# Patient Record
Sex: MALE | Race: WHITE | NOT HISPANIC OR LATINO | URBAN - METROPOLITAN AREA
[De-identification: names, ages, dates, MRNs, and addresses within clinical notes are randomized per-mention and may not be internally consistent; named-entity substitution may affect disease eponyms.]

---

## 2020-11-10 ENCOUNTER — OFFICE VISIT (OUTPATIENT)
Dept: FAMILY MEDICINE | Facility: CLINIC | Age: 75
End: 2020-11-10
Payer: MEDICARE

## 2020-11-10 VITALS
SYSTOLIC BLOOD PRESSURE: 136 MMHG | DIASTOLIC BLOOD PRESSURE: 84 MMHG | HEART RATE: 73 BPM | OXYGEN SATURATION: 96 % | WEIGHT: 172.81 LBS

## 2020-11-10 DIAGNOSIS — Z11.59 NEED FOR HEPATITIS C SCREENING TEST: ICD-10-CM

## 2020-11-10 DIAGNOSIS — I10 ESSENTIAL (PRIMARY) HYPERTENSION: ICD-10-CM

## 2020-11-10 DIAGNOSIS — I10 HYPERTENSION, UNSPECIFIED TYPE: Primary | ICD-10-CM

## 2020-11-10 PROCEDURE — 99204 OFFICE O/P NEW MOD 45 MIN: CPT | Mod: S$GLB,,, | Performed by: PHYSICIAN ASSISTANT

## 2020-11-10 PROCEDURE — 99204 PR OFFICE/OUTPT VISIT, NEW, LEVL IV, 45-59 MIN: ICD-10-PCS | Mod: S$GLB,,, | Performed by: PHYSICIAN ASSISTANT

## 2020-11-10 RX ORDER — NEOMYCIN SULFATE, POLYMYXIN B SULFATE AND DEXAMETHASONE 3.5; 10000; 1 MG/ML; [USP'U]/ML; MG/ML
SUSPENSION/ DROPS OPHTHALMIC
COMMUNITY

## 2020-11-10 RX ORDER — LOSARTAN POTASSIUM 50 MG/1
50 TABLET ORAL DAILY
COMMUNITY
End: 2020-11-22 | Stop reason: SDUPTHER

## 2020-11-10 RX ORDER — MULTIVITAMIN
1 TABLET ORAL DAILY
COMMUNITY

## 2020-11-10 NOTE — PROGRESS NOTES
Subjective:       Patient ID: Hudson Christie is a 75 y.o. male.    Chief Complaint: Establish Care (moved to area 3 weeks ago)    Hudson Christie is a 76 yo male coming to [facility]. He has HTN, but is stable.     Past Medical History:  No date: Cataract  No date: Hypertension    Past Surgical History:  No date: EYE SURGERY; Bilateral      Comment:  cataract  No date: HERNIA REPAIR  No date: JOINT REPLACEMENT; Right      Comment:  hip  No date: JOINT REPLACEMENT; Right      Comment:  knee  No date: VASECTOMY    History reviewed.  No pertinent family history.      Social History    Socioeconomic History      Marital status: Single      Spouse name: Not on file      Number of children: Not on file      Years of education: Not on file      Highest education level: Not on file    Occupational History      Not on file    Social Needs      Financial resource strain: Not on file      Food insecurity        Worry: Not on file        Inability: Not on file      Transportation needs        Medical: Not on file        Non-medical: Not on file    Tobacco Use      Smoking status: Never Smoker    Substance and Sexual Activity      Alcohol use: Never        Frequency: Never      Drug use: Never      Sexual activity: Yes    Lifestyle      Physical activity        Days per week: Not on file        Minutes per session: Not on file      Stress: Not on file    Relationships      Social connections        Talks on phone: Not on file        Gets together: Not on file        Attends Synagogue service: Not on file        Active member of club or organization: Not on file        Attends meetings of clubs or organizations: Not on file        Relationship status: Not on file    Other Topics      Concerns:        Not on file    Social History Narrative      Not on file      Review of patient's allergies indicates:  No Known Allergies    Current Outpatient Medications:   losartan (COZAAR) 50 MG tablet, Take 50 mg by mouth once daily., Disp: , Rfl:     multivitamin (THERAGRAN) per tablet, Take 1 tablet by mouth once daily., Disp: , Rfl:   neomycin-polymyxin-dexamethasone (MAXITROL) 3.5mg/mL-10,000 unit/mL-0.1 % DrpS, neomycin-polymyxin-dexameth 3.5 mg/mL-10,000 unit/mL-0.1% eye drops, Disp: , Rfl:     /84   Pulse 73   Wt 78.4 kg (172 lb 13.5 oz)   SpO2 96%     Hypertension  This is a chronic problem. The current episode started more than 1 year ago. The problem is unchanged. The problem is controlled. Pertinent negatives include no anxiety, blurred vision, chest pain, headaches, malaise/fatigue, neck pain, orthopnea, palpitations, peripheral edema, PND, shortness of breath or sweats. There are no associated agents to hypertension. Past treatments include ACE inhibitors and diuretics. The current treatment provides moderate improvement. There are no compliance problems.      Review of Systems   Constitutional: Negative for activity change, appetite change and malaise/fatigue.   Eyes: Negative for blurred vision.   Respiratory: Negative for chest tightness and shortness of breath.    Cardiovascular: Negative for chest pain, palpitations, orthopnea and PND.   Gastrointestinal: Negative for abdominal pain and constipation.   Genitourinary: Negative.    Musculoskeletal: Positive for arthralgias. Negative for neck pain.   Neurological: Negative for headaches.         Objective:      Physical Exam  Vitals signs reviewed.   Constitutional:       General: He is not in acute distress.     Appearance: Normal appearance. He is normal weight. He is not ill-appearing, toxic-appearing or diaphoretic.   HENT:      Head: Normocephalic and atraumatic.      Right Ear: Tympanic membrane, ear canal and external ear normal. There is no impacted cerumen.      Left Ear: Tympanic membrane, ear canal and external ear normal. There is no impacted cerumen.      Nose: Nose normal.   Eyes:      Conjunctiva/sclera: Conjunctivae normal.   Neck:      Musculoskeletal: Normal range of  motion and neck supple. No neck rigidity or muscular tenderness.      Vascular: No carotid bruit.   Cardiovascular:      Rate and Rhythm: Normal rate and regular rhythm.      Pulses: Normal pulses.      Heart sounds: Normal heart sounds. No murmur. No friction rub. No gallop.    Pulmonary:      Effort: Pulmonary effort is normal. No respiratory distress.      Breath sounds: Normal breath sounds. No stridor. No wheezing, rhonchi or rales.   Chest:      Chest wall: No tenderness.   Abdominal:      General: Abdomen is flat. Bowel sounds are normal. There is no distension.      Palpations: Abdomen is soft. There is no mass.      Tenderness: There is no abdominal tenderness. There is no right CVA tenderness, left CVA tenderness, guarding or rebound.      Hernia: No hernia is present.   Musculoskeletal: Normal range of motion.         General: No swelling or tenderness.   Lymphadenopathy:      Cervical: No cervical adenopathy.   Skin:     General: Skin is warm and dry.   Neurological:      General: No focal deficit present.      Mental Status: He is alert.   Psychiatric:         Mood and Affect: Mood normal.         Behavior: Behavior normal.         Thought Content: Thought content normal.         Judgment: Judgment normal.         Assessment:       1. Hypertension, unspecified type    2. Need for hepatitis C screening test    3. Essential (primary) hypertension         Plan:       Hypertension, unspecified type  -     Comprehensive Metabolic Panel; Future; Expected date: 11/10/2020  -     CBC Auto Differential; Future; Expected date: 11/10/2020    Need for hepatitis C screening test  -     Hepatitis C Antibody; Future; Expected date: 11/10/2020    Essential (primary) hypertension   -     Lipid Panel; Future; Expected date: 11/10/2020

## 2020-11-18 ENCOUNTER — LAB VISIT (OUTPATIENT)
Dept: FAMILY MEDICINE | Facility: CLINIC | Age: 75
End: 2020-11-18
Payer: MEDICARE

## 2020-11-18 DIAGNOSIS — I10 ESSENTIAL (PRIMARY) HYPERTENSION: ICD-10-CM

## 2020-11-18 DIAGNOSIS — I10 HYPERTENSION, UNSPECIFIED TYPE: ICD-10-CM

## 2020-11-18 DIAGNOSIS — Z11.59 NEED FOR HEPATITIS C SCREENING TEST: ICD-10-CM

## 2020-11-18 LAB
ALBUMIN SERPL BCP-MCNC: 3.9 G/DL (ref 3.5–5.2)
ALP SERPL-CCNC: 88 U/L (ref 55–135)
ALT SERPL W/O P-5'-P-CCNC: 16 U/L (ref 10–44)
ANION GAP SERPL CALC-SCNC: 8 MMOL/L (ref 8–16)
AST SERPL-CCNC: 18 U/L (ref 10–40)
BASOPHILS # BLD AUTO: 0.08 K/UL (ref 0–0.2)
BASOPHILS NFR BLD: 1.1 % (ref 0–1.9)
BILIRUB SERPL-MCNC: 0.7 MG/DL (ref 0.1–1)
BUN SERPL-MCNC: 21 MG/DL (ref 8–23)
CALCIUM SERPL-MCNC: 9.3 MG/DL (ref 8.7–10.5)
CHLORIDE SERPL-SCNC: 106 MMOL/L (ref 95–110)
CHOLEST SERPL-MCNC: 185 MG/DL (ref 120–199)
CHOLEST/HDLC SERPL: 4.2 {RATIO} (ref 2–5)
CO2 SERPL-SCNC: 25 MMOL/L (ref 23–29)
CREAT SERPL-MCNC: 1.2 MG/DL (ref 0.5–1.4)
DIFFERENTIAL METHOD: ABNORMAL
EOSINOPHIL # BLD AUTO: 0.3 K/UL (ref 0–0.5)
EOSINOPHIL NFR BLD: 4.5 % (ref 0–8)
ERYTHROCYTE [DISTWIDTH] IN BLOOD BY AUTOMATED COUNT: 13.2 % (ref 11.5–14.5)
EST. GFR  (AFRICAN AMERICAN): >60 ML/MIN/1.73 M^2
EST. GFR  (NON AFRICAN AMERICAN): 58 ML/MIN/1.73 M^2
GLUCOSE SERPL-MCNC: 98 MG/DL (ref 70–110)
HCT VFR BLD AUTO: 50.1 % (ref 40–54)
HDLC SERPL-MCNC: 44 MG/DL (ref 40–75)
HDLC SERPL: 23.8 % (ref 20–50)
HGB BLD-MCNC: 16.2 G/DL (ref 14–18)
IMM GRANULOCYTES # BLD AUTO: 0.02 K/UL (ref 0–0.04)
IMM GRANULOCYTES NFR BLD AUTO: 0.3 % (ref 0–0.5)
LDLC SERPL CALC-MCNC: 125.6 MG/DL (ref 63–159)
LYMPHOCYTES # BLD AUTO: 2.1 K/UL (ref 1–4.8)
LYMPHOCYTES NFR BLD: 28.5 % (ref 18–48)
MCH RBC QN AUTO: 31.5 PG (ref 27–31)
MCHC RBC AUTO-ENTMCNC: 32.3 G/DL (ref 32–36)
MCV RBC AUTO: 98 FL (ref 82–98)
MONOCYTES # BLD AUTO: 0.7 K/UL (ref 0.3–1)
MONOCYTES NFR BLD: 9.1 % (ref 4–15)
NEUTROPHILS # BLD AUTO: 4.1 K/UL (ref 1.8–7.7)
NEUTROPHILS NFR BLD: 56.5 % (ref 38–73)
NONHDLC SERPL-MCNC: 141 MG/DL
NRBC BLD-RTO: 0 /100 WBC
PLATELET # BLD AUTO: 289 K/UL (ref 150–350)
PMV BLD AUTO: 10.2 FL (ref 9.2–12.9)
POTASSIUM SERPL-SCNC: 4.9 MMOL/L (ref 3.5–5.1)
PROT SERPL-MCNC: 7.6 G/DL (ref 6–8.4)
RBC # BLD AUTO: 5.14 M/UL (ref 4.6–6.2)
SODIUM SERPL-SCNC: 139 MMOL/L (ref 136–145)
TRIGL SERPL-MCNC: 77 MG/DL (ref 30–150)
WBC # BLD AUTO: 7.33 K/UL (ref 3.9–12.7)

## 2020-11-18 PROCEDURE — 36415 COLL VENOUS BLD VENIPUNCTURE: CPT | Mod: S$GLB

## 2020-11-18 PROCEDURE — 80061 LIPID PANEL: CPT

## 2020-11-18 PROCEDURE — 86803 HEPATITIS C AB TEST: CPT

## 2020-11-18 PROCEDURE — 85025 COMPLETE CBC W/AUTO DIFF WBC: CPT

## 2020-11-18 PROCEDURE — 80053 COMPREHEN METABOLIC PANEL: CPT

## 2020-11-19 LAB — HCV AB SERPL QL IA: NEGATIVE

## 2020-11-20 ENCOUNTER — TELEPHONE (OUTPATIENT)
Dept: FAMILY MEDICINE | Facility: CLINIC | Age: 75
End: 2020-11-20

## 2020-11-20 NOTE — TELEPHONE ENCOUNTER
----- Message from Regine Rodas sent at 11/20/2020  2:43 PM CST -----  Contact: patient  Type:  Patient Returning Call    Who Called:  patient  Who Left Message for Patient:  indy  Does the patient know what this is regarding?:  yes  Best Call Back Number:  [phone]  Additional Information:  please call back to advise. Thanks!

## 2020-11-20 NOTE — TELEPHONE ENCOUNTER
----- Message from Bonnie Mc sent at 11/20/2020 10:19 AM CST -----  Contact: Patient  Test Results  Who Called: Patient  Name of Test: 11/18  Date of Test: Lab test  Ordering Provider: Dr. Peraza  Where the test was performed[facility] Call Back #: [phone]  Additional Info: Requesting call back regarding test results

## 2020-11-22 RX ORDER — LOSARTAN POTASSIUM 50 MG/1
50 TABLET ORAL DAILY
Qty: 90 TABLET | Refills: 3 | Status: SHIPPED | OUTPATIENT
Start: 2020-11-22

## 2020-11-22 RX ORDER — SILDENAFIL 50 MG/1
50 TABLET, FILM COATED ORAL DAILY PRN
Qty: 90 TABLET | Refills: 1 | Status: SHIPPED | OUTPATIENT
Start: 2020-11-22 | End: 2021-05-21

## 2020-11-24 ENCOUNTER — OFFICE VISIT (OUTPATIENT)
Dept: FAMILY MEDICINE | Facility: CLINIC | Age: 75
End: 2020-11-24
Payer: MEDICARE

## 2020-11-24 ENCOUNTER — TELEPHONE (OUTPATIENT)
Dept: FAMILY MEDICINE | Facility: CLINIC | Age: 75
End: 2020-11-24

## 2020-11-24 VITALS
HEART RATE: 66 BPM | OXYGEN SATURATION: 96 % | DIASTOLIC BLOOD PRESSURE: 94 MMHG | WEIGHT: 169.19 LBS | SYSTOLIC BLOOD PRESSURE: 144 MMHG

## 2020-11-24 DIAGNOSIS — I10 HYPERTENSION, UNSPECIFIED TYPE: Primary | ICD-10-CM

## 2020-11-24 PROCEDURE — 99214 OFFICE O/P EST MOD 30 MIN: CPT | Mod: S$GLB,,, | Performed by: PHYSICIAN ASSISTANT

## 2020-11-24 PROCEDURE — 99214 PR OFFICE/OUTPT VISIT, EST, LEVL IV, 30-39 MIN: ICD-10-PCS | Mod: S$GLB,,, | Performed by: PHYSICIAN ASSISTANT

## 2020-11-24 NOTE — PROGRESS NOTES
Subjective:       Patient ID: Hudson Christie is a 75 y.o. male.    Chief Complaint: Hypertension (CT results, phlegm )    Hypertension  This is a chronic problem. The current episode started more than 1 year ago. The problem has been waxing and waning since onset. The problem is uncontrolled. Associated symptoms include anxiety. Pertinent negatives include no blurred vision, chest pain, headaches, malaise/fatigue, neck pain, orthopnea, palpitations, peripheral edema, PND, shortness of breath or sweats. There are no associated agents (His anxiety is elevated because he is being forced to move out of his home. ) to hypertension. Risk factors for coronary artery disease include stress. Past treatments include angiotensin blockers.     Past Medical History:   Diagnosis Date    Cataract     Hypertension        Review of Systems   Constitutional: Negative for activity change, appetite change and malaise/fatigue.   Eyes: Negative for blurred vision.   Respiratory: Negative for chest tightness and shortness of breath.    Cardiovascular: Negative for chest pain, palpitations, orthopnea and PND.   Gastrointestinal: Negative for abdominal pain.   Musculoskeletal: Negative for neck pain.   Neurological: Negative for headaches.         Objective:      Physical Exam  Vitals signs reviewed.   Constitutional:       General: He is not in acute distress.     Appearance: Normal appearance. He is normal weight. He is not ill-appearing, toxic-appearing or diaphoretic.   HENT:      Head: Normocephalic and atraumatic.   Cardiovascular:      Rate and Rhythm: Normal rate and regular rhythm.      Pulses: Normal pulses.      Heart sounds: Normal heart sounds. No murmur. No friction rub. No gallop.    Pulmonary:      Effort: Pulmonary effort is normal. No respiratory distress.      Breath sounds: Normal breath sounds. No stridor. No wheezing, rhonchi or rales.   Chest:      Chest wall: No tenderness.   Neurological:      Mental Status: He is  alert.         Assessment:       1. Hypertension, unspecified type        Plan:       Hypertension, unspecified type       Suggest to follow up in a month to recheck BP

## 2020-11-24 NOTE — TELEPHONE ENCOUNTER
----- Message from Jennifer Schmitz MA sent at 2020  1:07 PM CST -----  Regardin meds  PT is requesting a call back in regards to a script that he just got from his appt and the cost is a little to high   Meds: losartan (COZAAR) 50 MG tablet  Call back # [phone]

## 2020-11-25 ENCOUNTER — TELEPHONE (OUTPATIENT)
Dept: FAMILY MEDICINE | Facility: CLINIC | Age: 75
End: 2020-11-25

## 2020-11-25 NOTE — TELEPHONE ENCOUNTER
----- Message from Laura Correia sent at 11/25/2020  2:18 PM CST -----  Type: Needs Medical Advice  Who Called:  patient  Symptoms (please be specific):    How long has patient had these symptoms:    Pharmacy name and phone #:    Best Call Back Number: [phone]  Additional Information: requesting a call back from Naomi regarding medication sildenafiL (VIAGRA) 50 MG tablet.

## 2020-11-25 NOTE — TELEPHONE ENCOUNTER
Vance Peraza III, PARTHA  to Me          11/24/20 1:39 PM  Not sure how much money that is. I suggest that he call around to the different pharmacy and see which one is cheaper. I can resend it there.   Called pt, he stated he tried to get rx at [facility] and was given an outrageous price. [facility], [facility] in the rx there (losartan and generic Viagra). Pharmacist stated the losartan is showing too soon for refill. Called [facility], canceled rx but they did not have pt insurance on file so it was not ran through them. Called [facility], they stated the losartan was filled on 10/22/20 for a 90 day supply through [facility] pharmacy in [place]. The Viagra isn't covered will need a PA. Called pt, notified and he verbally understood. He stated he does have the pills from 10/22/20. He stated [place] used to the [facility]. He requested PA done for Viagra and we will go from there. PA initiated.  Awaiting insurance response.

## 2020-11-25 NOTE — TELEPHONE ENCOUNTER
PA denied.     Pt notified. Advised the rx is $19.52 at [place] with good rx coupon he verbally understood. rx printed for him to , and [facility] [place],.

## 2020-11-25 NOTE — TELEPHONE ENCOUNTER
----- Message from Amanda Saenz sent at 11/25/2020 12:24 PM CST -----  Regarding: return call  Contact: self  Type:  Patient Returning Call    Who Called:  self  Who Left Message for Patient:  Naomi  Does the patient know what this is regarding?:    Best Call Back Number:  [phone]  Additional Information:

## 2020-12-02 ENCOUNTER — TELEPHONE (OUTPATIENT)
Dept: FAMILY MEDICINE | Facility: CLINIC | Age: 75
End: 2020-12-02

## 2022-01-11 ENCOUNTER — PATIENT OUTREACH (OUTPATIENT)
Dept: ADMINISTRATIVE | Facility: HOSPITAL | Age: 77
End: 2022-01-11
Payer: MEDICARE

## 2022-01-11 NOTE — LETTER
January 11, 2022    Hudson Christie  [address] [address] [facility] [address]             [facility]  [address]  [place] [facility] [address]  Phone: [phone] Dear Jim, [facility] is committed to your overall health. Currently, we have Vance Peraza III, PA-C listed as your primary care provider. If this is incorrect, please let us know by emailing or contacting our office at [phone] so we can update our records.    Our records show you have not been seen in twelve months by your primary care provider. Please schedule online through your [email or web address]  account or call our office at [phone].   You may also be due for the following test and/or procedures:    Health Maintenance Due   Topic    COVID-19 Vaccine     TETANUS VACCINE     Shingles Vaccine     Pneumococcal Vaccines     Influenza Vaccine        If you already have any of the above scheduled, please disregard this message.  If you have had any of the above done at a non-[facility] facility, please notify us so that we can obtain a copy or bring a copy to your next Primary Care visit.    Your [facility] care team is committed to supporting your overall health. We look forward to seeing you soon and appreciate the opportunity to serve you.      Sincerely,     Vance Peraza III, PA-C and your [facility] Primary Care Team

## 2022-08-22 ENCOUNTER — PATIENT OUTREACH (OUTPATIENT)
Dept: ADMINISTRATIVE | Facility: HOSPITAL | Age: 77
End: 2022-08-22
Payer: MEDICARE

## 2022-08-22 NOTE — PROGRESS NOTES
Non-compliant report chart audits for ATTRIBUTED PATIENTS NOT SEEN IN THE LAST 12 MONTHS    RE:  Patient needs appointment    Number changed or disconnected    Letter out    Outreach:  Attributed patient not seen in the last 12 months

## 2022-08-22 NOTE — LETTER
August 22, 2022    Hudson Christie  [address] [address] [address]             [facility]  [address]  [place] [address]  Phone: [phone] Dear Jim, [facility] is committed to your overall health. Currently, we have Vance Peraza III, PA-C listed as your primary care provider. If this is incorrect, please let us know by emailing or contacting our office at [phone] so we can update our records.    Our records show you have not been seen in twelve months by your primary care provider. Please schedule online through your [email or web address]  account or call our office at [phone].   You may also be due for the following test and/or procedures:    Health Maintenance Due   Topic    COVID-19 Vaccine     TETANUS VACCINE     Shingles Vaccine     Pneumococcal Vaccines        If you already have any of the above scheduled, please disregard this message.  If you have had any of the above done at a non-[facility] facility, please notify us so that we can obtain a copy or bring a copy to your next Primary Care visit.    Your [facility] care team is committed to supporting your overall health. We look forward to seeing you soon and appreciate the opportunity to serve you.      Sincerely,     Vance Peraza III, PA-C and your [facility] Primary Care Team